# Patient Record
(demographics unavailable — no encounter records)

---

## 2024-10-09 NOTE — HISTORY OF PRESENT ILLNESS
[FreeTextEntry1] : Pt is a 57 y/o F who presents today for initial evaluation.  Pt has PMH DM, HTN, HLD She does not have list of medications She is feeling well overall - denies CP, SOB, diaphoresis, palpitations, dizziness, syncope, LE edema, PND, orthopnea.  Home 's/60's   PCP Dayana Livingston Previous hospitalizations: none  Previous surgeries: none Family hx: no SCD, no cardiac disease Smoking status: never no ETOH no drug use Current exercise: none  Daily water intake:  Daily caffeine intake: OTC medications: NKDA Previous cardiac testing: none 1 child - GDM

## 2024-10-09 NOTE — DISCUSSION/SUMMARY
[EKG obtained to assist in diagnosis and management of assessed problem(s)] : EKG obtained to assist in diagnosis and management of assessed problem(s) [FreeTextEntry1] : Pt is a 57 y/o F PMH DM, HTN, HLD She does not have list of medications  Will check transthoracic echocardiogram to evaluate left ventricular function and assess for any structural abnormalities  check CCTA to eval for CAD  HTN: well controlled c/w current meds (I do not know meds, advised to bring a list next OV) Discussed the long-term health risks of uncontrolled BP.  Advised low salt diet, regular exercise, maintaining ideal weight. Encouraged pt to check BP at home and keep journal   HLD: c/w statin goal LDL <70 Advised lifestyle modifications  will try to get copy of recent labs from PCP  Pt will return in 6 mnths or sooner as needed but I encouraged communication via phone or portal if necessary.  We will call pt with test results when applicable and arrange for expedited follow up if necessary.  The described plan was discussed with the pt.  All questions and concerns were addressed to the best of my knowledge.